# Patient Record
Sex: FEMALE | Race: WHITE
[De-identification: names, ages, dates, MRNs, and addresses within clinical notes are randomized per-mention and may not be internally consistent; named-entity substitution may affect disease eponyms.]

---

## 2017-04-14 ENCOUNTER — HOSPITAL ENCOUNTER (EMERGENCY)
Dept: HOSPITAL 31 - C.ER | Age: 27
Discharge: HOME | End: 2017-04-14
Payer: COMMERCIAL

## 2017-04-14 VITALS — BODY MASS INDEX: 21.2 KG/M2

## 2017-04-14 VITALS
TEMPERATURE: 98 F | DIASTOLIC BLOOD PRESSURE: 71 MMHG | SYSTOLIC BLOOD PRESSURE: 106 MMHG | HEART RATE: 70 BPM | OXYGEN SATURATION: 98 %

## 2017-04-14 VITALS — RESPIRATION RATE: 20 BRPM

## 2017-04-14 DIAGNOSIS — B37.3: Primary | ICD-10-CM

## 2017-04-14 LAB
BACTERIA #/AREA URNS HPF: (no result) /[HPF]
BILIRUB UR-MCNC: NEGATIVE MG/DL
GLUCOSE UR STRIP-MCNC: NORMAL MG/DL
KETONES UR STRIP-MCNC: NEGATIVE MG/DL
LEUKOCYTE ESTERASE UR-ACNC: (no result) LEU/UL
PH UR STRIP: 5 [PH] (ref 5–8)
PROT UR STRIP-MCNC: (no result) MG/DL
RBC # UR STRIP: (no result) /UL
RBC #/AREA URNS HPF: 91 /HPF (ref 0–3)
SP GR UR STRIP: 1.03 (ref 1–1.03)
UROBILINOGEN UR-MCNC: NORMAL MG/DL (ref 0.2–1)
WBC #/AREA URNS HPF: 5 /HPF (ref 0–5)

## 2017-04-14 NOTE — C.PDOC
History Of Present Illness


A 26 year old female presents to the emergency room with complaints of vaginal 

pruritis and  vaginal discharge for 1 week. Patient was seen by GYN 2 weeks ago 

and was diagnosed with bacterial vaginosis. Patient was given Metrogel which 

finished 3 days ago. Patient reports continued discharge. Patient states that 

she went to the GYN and was told to wait a little bit longer for symptoms to 

resolve. Patient felt uncomfortable and decided to come to the ER. Patient 

denies any dysuria, frequency, hematuria, abdominal pain, back pain, 

incontinence, fever, chills, or any other complaints. 


Time Seen by Provider: 04/14/17 20:48


Chief Complaint (Nursing): Female Genitourinary


History Per: Patient


History/Exam Limitations: no limitations


Onset/Duration Of Symptoms: Other (1 week)


Current Symptoms Are (Timing): Still Present


Severity: Mild


Quality Of Discomfort: Other (Uncomfortable)


Associated Symptoms: denies: Fever, Chills, Urinary Symptoms


Alleviating Factors: None


Recent travel outside of the [place]: No





Past Medical History


Reviewed: Historical Data, Nursing Documentation, Vital Signs


Vital Signs: 


 Last Vital Signs











Temp  98 F   04/14/17 20:35


 


Pulse  70   04/14/17 20:35


 


Resp  20   04/14/17 22:20


 


BP  106/71   04/14/17 20:35


 


Pulse Ox  98   04/14/17 22:33














- [facility] Procedures








INJECT/INFUSE NEC (08/29/13)








Family History: States: Unknown Family Hx





- Social History


Hx Tobacco Use: No


Hx Alcohol Use: No


Hx Substance Use: No





- Immunization History


Hx Tetanus Toxoid Vaccination: No


Hx Influenza Vaccination: No


Hx Pneumococcal Vaccination: No





Review Of Systems


Except As Marked, All Systems Reviewed And Found Negative.


Constitutional: Negative for: Fever, Chills


Genitourinary: Positive for: Vaginal Discharge.  Negative for: Dysuria, 

Frequency, Incontinence, Hematuria





Physical Exam





- Physical Exam


Appears: Well


Skin: Normal Color, Warm, Dry


Head: Atraumatic, Normacephalic


Eye(s): bilateral: Normal Inspection


Cardiovascular: Rhythm Regular


Respiratory: Normal Breath Sounds, No Rales, No Rhonchi, No Wheezing


Gastrointestinal/Abdominal: Soft, No Tenderness, No Guarding, No Rebound


Pelvic: No Vaginal Bleeding, Vaginal Discharge (White clumps of cottage cheese 

like discharge), No Cervical Motion Tenderness, No Adnexal Tenderness, No Mass


Extremity: Normal ROM, No Tenderness, Swelling (Volar swelling)


Neurological/Psych: Oriented x3, Normal Speech





ED Course And Treatment





- Laboratory Results


Urine Pregnancy POC: Negative


O2 Sat by Pulse Oximetry: 98


Progress Note: Urine Preg was negative. Patient was swabbed, but didn't treat 

for GC/Chlamydia because clinical presentation looks like vaginitis. Patient 

received Diflucan and prescription for tablets to take in 1 week, if symptoms 

do not resolve. Patient denies any abdominal pain, vaginal bleeding, dysuria, 

frequency, back pain, or any other complaints. Patient is afebrile and stable 

for discharge. Patient instructed to follow up with GYN within 1-2 days.





Disposition





- Disposition


Disposition: HOME/ ROUTINE


Disposition Time: 22:00


Condition: STABLE


Additional Instructions: 


Follow up with your OBGYN within 1-2 days. Return to Ed if feel worse.


Prescriptions: 


Fluconazole [Diflucan] 150 mg PO ONCE #1 tab


Instructions:  Vulvovaginal Candidiasis (ED)





- Clinical Impression


Clinical Impression: 


 Candidal vulvovaginitis





- Scribe Statement


The provider has reviewed the documentation as recorded by the Scriblouann Sosa





All medical record entries made by the Sujataiblouann were at my direction and 

personally dictated by me. I have reviewed the chart and agree that the record 

accurately reflects my personal performance of the history, physical exam, 

medical decision making, and the department course for this patient. I have 

also personally directed, reviewed, and agree with the discharge instructions 

and disposition.

## 2017-07-28 ENCOUNTER — HOSPITAL ENCOUNTER (EMERGENCY)
Dept: HOSPITAL 31 - C.ER | Age: 27
Discharge: HOME | End: 2017-07-28
Payer: COMMERCIAL

## 2017-07-28 VITALS — BODY MASS INDEX: 21.2 KG/M2

## 2017-07-28 VITALS
HEART RATE: 85 BPM | TEMPERATURE: 98 F | RESPIRATION RATE: 16 BRPM | DIASTOLIC BLOOD PRESSURE: 74 MMHG | SYSTOLIC BLOOD PRESSURE: 111 MMHG | OXYGEN SATURATION: 100 %

## 2017-07-28 DIAGNOSIS — N76.0: Primary | ICD-10-CM

## 2017-07-28 LAB
BILIRUB UR-MCNC: NEGATIVE MG/DL
GLUCOSE UR STRIP-MCNC: NORMAL MG/DL
KETONES UR STRIP-MCNC: NEGATIVE MG/DL
LEUKOCYTE ESTERASE UR-ACNC: (no result) LEU/UL
PH UR STRIP: 6 [PH] (ref 5–8)
PROT UR STRIP-MCNC: NEGATIVE MG/DL
RBC # UR STRIP: (no result) /UL
RBC #/AREA URNS HPF: 24 /HPF (ref 0–3)
SP GR UR STRIP: 1.02 (ref 1–1.03)
UROBILINOGEN UR-MCNC: NORMAL MG/DL (ref 0.2–1)
WBC #/AREA URNS HPF: < 1 /HPF (ref 0–5)

## 2017-07-28 NOTE — C.PDOC
History Of Present Illness





25 yo female come in for evaluation of vaginal irritation, white discharges 

developed for past few days " after sexual intercourse and condom broke". 

Otherwise, pt denies fever, chills, sore throat, abd. pain, N/V, back pain, 

hematuria, denies lesion. Ambulate to ED for evaluation, not in any apparent 

distress.


Time Seen by Provider: 07/28/17 19:44


Chief Complaint (Nursing): Female Genitourinary


History Per: Patient


Onset/Duration Of Symptoms: Gradual





Past Medical History


Reviewed: Historical Data, Nursing Documentation, Vital Signs


Vital Signs: 





 Last Vital Signs











Temp  98 F   07/28/17 19:35


 


Pulse  85   07/28/17 19:35


 


Resp  16   07/28/17 19:35


 


BP  111/74   07/28/17 19:35


 


Pulse Ox  100   07/28/17 19:35














- Medical History


PMH: No Chronic Diseases


Surgical History: No Surg Hx





- [facility] Procedures











INJECT/INFUSE NEC (08/29/13)








Family History: States: No Known Family Hx





- Social History


Hx Tobacco Use: No


Hx Alcohol Use: No


Hx Substance Use: No





- Immunization History


Hx Tetanus Toxoid Vaccination: No


Hx Influenza Vaccination: No


Hx Pneumococcal Vaccination: No





Review Of Systems


Except As Marked, All Systems Reviewed And Found Negative.


Constitutional: Negative for: Fever, Chills


ENT: Negative for: Ear Discharge, Nose Discharge, Throat Pain


Cardiovascular: Negative for: Chest Pain


Respiratory: Negative for: Cough, Shortness of Breath, Wheezing


Gastrointestinal: Negative for: Nausea, Vomiting, Abdominal Pain


Genitourinary: Positive for: Vaginal Discharge.  Negative for: Dysuria, 

Frequency, Incontinence, Hematuria, Vaginal Bleeding, Rash


Musculoskeletal: Negative for: Back Pain


Skin: Negative for: Rash


Neurological: Negative for: Weakness, Numbness, Altered Mental Status, Headache

, Dizziness





Physical Exam





- Physical Exam


Appears: Well, Non-toxic, No Acute Distress


Skin: Normal Color, Warm, No Rash


Nose: No Discharge


Oral Mucosa: Moist, No Drooling


Throat: No Erythema, No Exudate, No Drooling


Neck: Supple


Cardiovascular: Rhythm Regular


Respiratory: No Decreased Breath Sounds, No Accessory Muscle Use, No Stridor, 

No Wheezing


Gastrointestinal/Abdominal: Soft, No Tenderness, No Distention, No Guarding


Back: No CVA Tenderness


Pelvic: Other (refused)


Extremity: Normal ROM, No Tenderness, No Pedal Edema, No Deformity


Neurological/Psych: Oriented x3, Normal Speech





ED Course And Treatment





- Laboratory Results


Urine Pregnancy POC: Negative


O2 Sat by Pulse Oximetry: 100


Pulse Ox Interpretation: Normal


Progress Note: On re-evaluation, pt is afebrile, hemodynamicaly stable.  Non-

toxic.  Ambulatory in ED.  PuslEOx 100% RA.  ENT: no acute findings.  Lungs: CT 

B/L, BS equal B/L.  ABd: benign, (-) guarding, (-) rebound.  Back: (-) CVA 

tenderness.  UA review and appears normal.  Pt was offered STD tx-refused, pt 

sts " has scheduled appoitment next week with GYN and will do all there".  Pt 

has clinical findings c/w vulvovaginitis.  Pt advised and ref. to F/U with GYN 

as scheduled.  return to ED if any worsening or new changes.





Disposition


Counseled Patient/Family Regarding: Studies Performed, Diagnosis, Need For 

Followup, Rx Given





- Disposition


[facility]errals[facility] 


[facility]


Disposition: HOME/ ROUTINE


Disposition Time: 20:51


Condition: STABLE


Additional Instructions: 


Encourage fluids





Take medication as prescribed





Follow up with GYN in 2-3 days for re-evaluation.


Return to ED if any worsening or new changes.


Prescriptions: 


Miconazole/Cleanser 17 On Wipe [Monistat 7 Combination Pack] 1 each VG HS #1 kit


Instructions:  Vulvovaginal Candidiasis (ED)


Forms:  [facility] (English)





- Clinical Impression


Clinical Impression: 


 Vaginitis and vulvovaginitis

## 2017-09-16 ENCOUNTER — HOSPITAL ENCOUNTER (EMERGENCY)
Dept: HOSPITAL 31 - C.ER | Age: 27
LOS: 1 days | Discharge: HOME | End: 2017-09-17
Payer: COMMERCIAL

## 2017-09-16 VITALS — BODY MASS INDEX: 21.2 KG/M2
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
## 2017-09-16 DIAGNOSIS — S80.861A: ICD-10-CM

## 2017-09-16 DIAGNOSIS — S40.861A: Primary | ICD-10-CM

## 2017-09-16 DIAGNOSIS — S80.862A: ICD-10-CM
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
## 2017-09-17 VITALS
TEMPERATURE: 98 F | DIASTOLIC BLOOD PRESSURE: 79 MMHG | HEART RATE: 77 BPM | SYSTOLIC BLOOD PRESSURE: 123 MMHG | RESPIRATION RATE: 17 BRPM

## 2017-09-17 VITALS — OXYGEN SATURATION: 98 %

## 2017-09-17 NOTE — C.PDOC
History Of Present Illness


26 year old female who presents to the ER with a complaint of an itchy insect 

bite to the right arm and right leg. Denies difficulty breathing, fever, or 

recent travel.





Time Seen by Provider: 09/16/17 23:58


Chief Complaint (Nursing): Abnormal Skin Integrity


History Per: Patient


History/Exam Limitations: no limitations


Onset/Duration Of Symptoms: Hrs


Current Symptoms Are (Timing): Still Present


Location Of Injury: Right: Arm, Leg


Quality Of Symptoms: Itching


Recent travel outside of the [place]: No





Past Medical History


Reviewed: Historical Data, Nursing Documentation, Vital Signs


Vital Signs: 


 Last Vital Signs











Temp  98 F   09/17/17 00:50


 


Pulse  77   09/17/17 00:50


 


Resp  17   09/17/17 00:50


 


BP  123/79   09/17/17 00:50


 


Pulse Ox  98   09/17/17 02:57














- Medical History


PMH: No Chronic Diseases


Surgical History: No Surg Hx





- [facility] Procedures








INJECT/INFUSE NEC (08/29/13)








Family History: States: Unknown Family Hx





- Social History


Hx Tobacco Use: No


Hx Alcohol Use: No


Hx Substance Use: No





- Immunization History


Hx Tetanus Toxoid Vaccination: No


Hx Influenza Vaccination: No


Hx Pneumococcal Vaccination: No





Review Of Systems


Constitutional: Negative for: Fever, Chills


ENT: Negative for: Throat Swelling


Respiratory: Negative for: Shortness of Breath, Wheezing


Skin: Positive for: Rash





Physical Exam





- Physical Exam


Appears: Non-toxic, No Acute Distress


Skin: Warm, Dry, Rash (Scattered erythematous papules to right upper extremity 

and bilateral lower extremities. No swelling or warmth.)


Head: Atraumatic, Normacephalic


Oral Mucosa: Moist


Chest: Symmetrical, No Tenderness


Cardiovascular: Rhythm Regular, No Murmur


Respiratory: Normal Breath Sounds, No Rales, No Rhonchi, No Stridor, No Wheezing


Extremity: Normal ROM (x4)


Pulses: Left Radial: Normal, Right Radial: Normal, Left Dorsalis Pedis: Normal, 

Right Dorsalis Pedis: Normal


Neurological/Psych: Oriented x3, Normal Speech, Normal Cognition


Gait: Steady





ED Course And Treatment


O2 Sat by Pulse Oximetry: 98 (Room air)


Pulse Ox Interpretation: Normal


Progress Note: Benadryl administered. On reevaluation, patient itchiness has 

resolved, will discharge home with wound care instructions and instruct to 

follow up with PMD.





Disposition


Counseled Patient/Family Regarding: Diagnosis, Need For Followup, Rx Given





- Disposition


Referrals: 


Evan Eaton MD [Staff Provider] - 


Disposition: HOME/ ROUTINE


Disposition Time: 00:43


Condition: STABLE


Additional Instructions: 


May use hydrocortisone cream





Claritin or zyrtec if needed for itching





Return to ER if increasing pain, fever, swelling or draining


Instructions:  Insect Bite or Sting (ED)


Forms:  [facility] (English)





- Clinical Impression


Clinical Impression: 


 Insect bite








- Scribe Statement


The provider has reviewed the documentation as recorded by the Scriblouann Mcknight





All medical record entries made by the Sujataiblouann were at my direction and 

personally dictated by me. I have reviewed the chart and agree that the record 

accurately reflects my personal performance of the history, physical exam, 

medical decision making, and the department course for this patient. I have 

also personally directed, reviewed, and agree with the discharge instructions 

and disposition.

## 2017-09-30 ENCOUNTER — HOSPITAL ENCOUNTER (EMERGENCY)
Dept: HOSPITAL 31 - C.ER | Age: 27
Discharge: HOME | End: 2017-09-30
Payer: COMMERCIAL

## 2017-09-30 VITALS — RESPIRATION RATE: 16 BRPM | HEART RATE: 81 BPM | SYSTOLIC BLOOD PRESSURE: 138 MMHG | DIASTOLIC BLOOD PRESSURE: 84 MMHG

## 2017-09-30 VITALS — TEMPERATURE: 98.3 F

## 2017-09-30 VITALS — BODY MASS INDEX: 21.2 KG/M2

## 2017-09-30 DIAGNOSIS — N76.0: Primary | ICD-10-CM

## 2017-09-30 LAB
BACTERIA #/AREA URNS HPF: (no result) /[HPF]
BILIRUB UR-MCNC: NEGATIVE MG/DL
COLOR UR: YELLOW
GLUCOSE UR STRIP-MCNC: NEGATIVE MG/DL
KETONES UR STRIP-MCNC: NEGATIVE MG/DL
LEUKOCYTE ESTERASE UR-ACNC: NEGATIVE LEU/UL
PH UR STRIP: 6 [PH] (ref 5–8)
PROT UR STRIP-MCNC: NEGATIVE MG/DL
RBC # UR STRIP: (no result) /UL
RBC #/AREA URNS HPF: 18 /HPF (ref 0–3)
SP GR UR STRIP: 1.03 (ref 1–1.03)
UROBILINOGEN UR-MCNC: 0.2 MG/DL (ref 0.2–1)
WBC #/AREA URNS HPF: 1 /HPF (ref 0–5)

## 2017-09-30 NOTE — C.PDOC
History Of Present Illness


26 year old female presents to the ED for evaluation of vaginal irritation and 

itchiness associated with scant white discharged which developed over the past 

week. Patient admits she was using over-the-counter medication for yeast 

infection without significant improvements. Patient denies fever, chills, 

abdominal pain, dysuria, hematuria, increased urinary frequency. 


Time Seen by Provider: 09/30/17 14:13


Chief Complaint (Nursing): Female Genitourinary


History Per: Patient


History/Exam Limitations: no limitations


Onset/Duration Of Symptoms: Days (1 week)


Current Symptoms Are (Timing): Still Present


Quality Of Discomfort: denies: "Pain"


Associated Symptoms: denies: Fever, Chills, Urinary Symptoms, Other


Additional History Per: Patient


Abnormal Vaginal Bleeding: No





Past Medical History


Reviewed: Historical Data, Nursing Documentation, Vital Signs


Vital Signs: 


 Last Vital Signs











Temp  98.3 F   09/30/17 13:59


 


Pulse  81   09/30/17 16:00


 


Resp  16   09/30/17 16:00


 


BP  138/84   09/30/17 16:00


 


Pulse Ox  98   09/30/17 16:00














- Medical History


PMH: No Chronic Diseases


Surgical History: No Surg Hx





- [facility] Procedures








INJECT/INFUSE NEC (08/29/13)








Family History: States: Unknown Family Hx





- Social History


Hx Tobacco Use: No


Hx Alcohol Use: No


Hx Substance Use: No





- Immunization History


Hx Tetanus Toxoid Vaccination: No


Hx Influenza Vaccination: No


Hx Pneumococcal Vaccination: No





Review Of Systems


Constitutional: Negative for: Fever, Chills


Gastrointestinal: Negative for: Abdominal Pain


Genitourinary: Positive for: Vaginal Discharge (scant, white ), Other (vaginal 

irritation/itching ).  Negative for: Dysuria, Frequency, Hematuria





Physical Exam





- Physical Exam


Appears: Non-toxic, No Acute Distress


Skin: Normal Color, Warm, Dry


Oral Mucosa: Moist


Throat: No Erythema


Neck: Supple


Chest: Symmetrical, No Deformity


Cardiovascular: Rhythm Regular, No Murmur


Respiratory: No Decreased Breath Sounds, No Accessory Muscle Use, No Rales, No 

Rhonchi, No Wheezing


Gastrointestinal/Abdominal: Soft, No Tenderness, No Guarding, No Rebound


Pelvic: No Vaginal Bleeding, Vaginal Discharge (scant white thick discharges), 

No Cervical Motion Tenderness, No Adnexal Tenderness


Extremity: Normal ROM, No Pedal Edema


Neurological/Psych: Oriented x3, Normal Speech, Normal Cognition


Gait: Steady





ED Course And Treatment


O2 Sat by Pulse Oximetry: 100 (on RA)


Pulse Ox Interpretation: Normal


Progress Note: Urinalysis ordered and reviewed.  On re-eval, pt is afebrile, 

hemodynamicaly stable.  Non-toxic.  ENT: No acute finidngs.  Abd: benign.  Back

: (-) CVA tenderness.  UA results review and appears normal.  Pt has clinical 

findings c/w vulvovaginitis.  Pt advised.  ref to f/u with GYN in 2-3 days for 

re-eval.  return if any new changes.





Disposition


Counseled Patient/Family Regarding: Studies Performed, Diagnosis, Need For 

Followup, Rx Given





- Disposition


[facility]errals[facility] 


[facility]


Disposition: HOME/ ROUTINE


Disposition Time: 15:23


Condition: STABLE


Additional Instructions: 


Take medication as prescribed





Follow up with GYN in 2-3 days for re-evaluation.


Return to ED if any worsening or new changes.


Prescriptions: 


Miconazole/Cleanser 17 On Wipe [Monistat 7 Combination Pack] 1 each VG HS #1 kit


Instructions:  Vulvovaginal Candidiasis (ED)


Forms:  [facility] (English)





- Clinical Impression


Clinical Impression: 


 Vaginitis and vulvovaginitis








- PA / NP / Resident Statement


MD/DO has reviewed & agrees with the documentation as recorded.





- Scribe Statement


The provider has reviewed the documentation as recorded by the Scribe (Deyanira Zimmer)








All medical record entries made by the Scribe were at my direction and 

personally dictated by me. I have reviewed the chart and agree that the record 

accurately reflects my personal performance of the history, physical exam, 

medical decision making, and the department course for this patient. I have 

also personally directed, reviewed, and agree with the discharge instructions 

and disposition.

## 2017-10-01 VITALS — OXYGEN SATURATION: 100 %

## 2018-01-10 ENCOUNTER — HOSPITAL ENCOUNTER (EMERGENCY)
Dept: HOSPITAL 31 - C.ER | Age: 28
LOS: 1 days | Discharge: HOME | End: 2018-01-11
Payer: COMMERCIAL

## 2018-01-10 VITALS — BODY MASS INDEX: 21.2 KG/M2

## 2018-01-10 DIAGNOSIS — N76.0: ICD-10-CM

## 2018-01-10 DIAGNOSIS — Z87.891: ICD-10-CM

## 2018-01-10 DIAGNOSIS — N39.0: ICD-10-CM

## 2018-01-10 DIAGNOSIS — J03.90: Primary | ICD-10-CM

## 2018-01-10 PROCEDURE — 87070 CULTURE OTHR SPECIMN AEROBIC: CPT

## 2018-01-10 PROCEDURE — 81001 URINALYSIS AUTO W/SCOPE: CPT

## 2018-01-10 PROCEDURE — 87430 STREP A AG IA: CPT

## 2018-01-10 PROCEDURE — 84703 CHORIONIC GONADOTROPIN ASSAY: CPT

## 2018-01-10 PROCEDURE — 87804 INFLUENZA ASSAY W/OPTIC: CPT

## 2018-01-10 PROCEDURE — 96372 THER/PROPH/DIAG INJ SC/IM: CPT

## 2018-01-10 PROCEDURE — 99282 EMERGENCY DEPT VISIT SF MDM: CPT

## 2018-01-11 VITALS
SYSTOLIC BLOOD PRESSURE: 112 MMHG | DIASTOLIC BLOOD PRESSURE: 73 MMHG | HEART RATE: 97 BPM | RESPIRATION RATE: 18 BRPM | TEMPERATURE: 99 F

## 2018-01-11 VITALS — OXYGEN SATURATION: 98 %

## 2018-01-11 LAB
BACTERIA #/AREA URNS HPF: (no result) /[HPF]
BILIRUB UR-MCNC: NEGATIVE MG/DL
GLUCOSE UR STRIP-MCNC: (no result) MG/DL
HCG,QUALITATIVE URINE: NEGATIVE
INFLUENZA A B: (no result)
LEUKOCYTE ESTERASE UR-ACNC: (no result) LEU/UL
PH UR STRIP: 6 [PH] (ref 5–8)
PROT UR STRIP-MCNC: (no result) MG/DL
RBC # UR STRIP: (no result) /UL
SP GR UR STRIP: 1.03 (ref 1–1.03)
SQUAMOUS EPITHIAL: 7 /HPF (ref 0–5)
URINE NITRATE: NEGATIVE
UROBILINOGEN UR-MCNC: NORMAL MG/DL (ref 0.2–1)
WBC CLUMPS # UR AUTO: (no result) /HPF

## 2018-01-11 NOTE — C.PDOC
History Of Present Illness


27 years old female presents to ED for an evaluation of fever, bodyaches, sore 

throat that began 2 days ago. Patient also reports " dehydrated, my urine has 

been dark color" . Otherwise, pt denies severe headache, neck pain, drooling,  

dyspnea, cough, wheezing, abdominal pain, V/D, urinary symptoms, rash, denies 

known sick contact. Ambulate to ED for evaluation, not in any apparent distress.


Time Seen by Provider: 01/11/18 00:11


Chief Complaint (Nursing): ENT Problem


History Per: Patient


History/Exam Limitations: no limitations


Onset/Duration Of Symptoms: Days (2)


Current Symptoms Are (Timing): Still Present


Recent travel outside of the [place]: No





Past Medical History


Reviewed: Historical Data, Nursing Documentation, Vital Signs


Vital Signs: 


 Last Vital Signs











Temp  102.8 F H  01/11/18 00:12


 


Pulse  107 H  01/11/18 00:12


 


Resp  20   01/11/18 00:12


 


BP  120/86   01/11/18 00:12


 


Pulse Ox  98   01/11/18 01:24














- Medical History


PMH: No Chronic Diseases


Surgical History: No Surg Hx





- [facility] Procedures








INJECT/INFUSE NEC (08/29/13)








Family History: States: Unknown Family Hx





- Social History


Hx Tobacco Use: No


Hx Alcohol Use: No


Hx Substance Use: No





- Immunization History


Hx Tetanus Toxoid Vaccination: No


Hx Influenza Vaccination: No


Hx Pneumococcal Vaccination: No





Review Of Systems


Constitutional: Positive for: Fever


ENT: Positive for: Other (Sore throat)


Respiratory: Negative for: Cough


Gastrointestinal: Negative for: Nausea, Vomiting, Abdominal Pain


Genitourinary: Positive for: Other (Discoloration of urine).  Negative for: 

Dysuria, Frequency, Hematuria


Musculoskeletal: Positive for: Other (Bodyaches)


Neurological: Negative for: Weakness, Numbness, Headache, Dizziness





Physical Exam





- Physical Exam


Appears: Well, Non-toxic


Skin: Warm, Dry, No Rash


Head: Normacephalic


Eye(s): bilateral: PERRL


Ear(s): Bilateral: Normal


Nose: No Flaring, Discharge (B/L nasal congestion )


Oral Mucosa: Moist, No Drooling


Tongue: Normal Appearing


Lips: Normal Appearing


Throat: Erythema (B/L pharyngeal with mild edema), Exudate (Bilateral tonsils 

with edema. Uvula midline, no edema.), No Drooling


Neck: Trachea Midline, Supple, Other ((-) meningeal sign)


Chest: No Tenderness


Cardiovascular: Rhythm Regular


Respiratory: No Decreased Breath Sounds, No Accessory Muscle Use, No Stridor, 

No Wheezing


Gastrointestinal/Abdominal: Soft, No Tenderness, No Organomegaly, No Distention

, No Guarding, No Rebound


Back: No CVA Tenderness


Neurological/Psych: Oriented x3, Normal Speech, Normal Cognition, Normal Motor, 

Normal Sensation, Normal Reflexes





ED Course And Treatment


O2 Sat by Pulse Oximetry: 98 (RA)


Pulse Ox Interpretation: Normal


Progress Note: Administered Ibuprofen. Ordered flu AB swab, rapid strep, and 

urinalysis.  On re-evaluation, pt reports " feels little better".  Fever 

improved, hemodynamicaly stable.  Non-toxic. Tolerate PO well in ED.  Neck: 

Supple, (-) meningeal sign.  ENT: no acute findings.  Lungs: CTA B/L, BS equal B

/L.  Abd: benign, (-) guaridng, (-) rebound.  neuorlogical intact.  Influenza 

AND RST (-). UA results review (+) UTI.  Pt has clinical findings c/w acute 

pharyngitis/tonsilitis, UTI. PT  advised.   REf. to F/u with PMD in 2-3 days 

for re-eavl.  return if any new changes.





Disposition


Counseled Patient/Family Regarding: Studies Performed, Diagnosis, Need For 

Followup, Rx Given





- Disposition


Referrals: 


Evan Eaton MD [Staff Provider] - 


Disposition: HOME/ ROUTINE


Disposition Time: 01:49


Condition: STABLE


Additional Instructions: 


BEDREST FOR 2-3 DAYS





TAKE MEDICATION AS PRESCRIBED





ENCOURAGE FLUIDS





FOLLOW UP WITH PMD, GYN IN 2-3 DAYS FOR RE-EVALUATION.


RETURN TO ED IF ANY WORSENING OR NEW CHANGES.


Prescriptions: 


Ciprofloxacin [Cipro] 1 tab PO BID #14 tab


Ibuprofen [Motrin Tab] 600 mg PO Q6 #14 tab


Miconazole [Miconazole 7] 1 supp VG HS #7 sup


Instructions:  Tonsillitis (ED), Urinary Tract Infection in Women (ED), 

Vulvovaginal Candidiasis (ED)


Forms:  [facility] (English)





- Clinical Impression


Clinical Impression: 


 Urinary tract infection, Vaginitis and vulvovaginitis, Tonsillitis








- PA / NP / Resident Statement


MD/DO has reviewed & agrees with the documentation as recorded.





- Scribe Statement


The provider has reviewed the documentation as recorded by the Scribe





Greg Barahona





All medical record entries made by the Scribe were at my direction and 

personally dictated by me. I have reviewed the chart and agree that the record 

accurately reflects my personal performance of the history, physical exam, 

medical decision making, and the department course for this patient. I have 

also personally directed, reviewed, and agree with the discharge instructions 

and disposition.

## 2018-09-14 ENCOUNTER — HOSPITAL ENCOUNTER (EMERGENCY)
Dept: HOSPITAL 31 - C.ER | Age: 28
Discharge: HOME | End: 2018-09-14
Payer: COMMERCIAL

## 2018-09-14 VITALS
OXYGEN SATURATION: 100 % | TEMPERATURE: 98.1 F | SYSTOLIC BLOOD PRESSURE: 117 MMHG | DIASTOLIC BLOOD PRESSURE: 78 MMHG | RESPIRATION RATE: 18 BRPM | HEART RATE: 77 BPM

## 2018-09-14 VITALS — BODY MASS INDEX: 21.2 KG/M2

## 2018-09-14 DIAGNOSIS — N39.0: ICD-10-CM

## 2018-09-14 DIAGNOSIS — A60.00: ICD-10-CM

## 2018-09-14 DIAGNOSIS — B37.3: Primary | ICD-10-CM

## 2018-09-14 LAB
BACTERIA #/AREA URNS HPF: (no result) /[HPF]
BILIRUB UR-MCNC: NEGATIVE MG/DL
GLUCOSE UR STRIP-MCNC: NORMAL MG/DL
HCG,QUALITATIVE URINE: NEGATIVE
LEUKOCYTE ESTERASE UR-ACNC: (no result) LEU/UL
PH UR STRIP: 6 [PH] (ref 5–8)
PROT UR STRIP-MCNC: (no result) MG/DL
RBC # UR STRIP: (no result) /UL
SP GR UR STRIP: 1.03 (ref 1–1.03)
SQUAMOUS EPITHIAL: 18 /HPF (ref 0–5)
UROBILINOGEN UR-MCNC: NORMAL MG/DL (ref 0.2–1)

## 2018-09-14 NOTE — C.PDOC
History Of Present Illness


26 y/o female presents to the ED complaining of vaginal itching for the past 2 

days. Associated with white discharge. Denies any fever, vaginal bleeding, and 

abdominal pain.





Time Seen by Provider: 09/14/18 11:59


Chief Complaint (Nursing): Female Genitourinary


History Per: Patient


History/Exam Limitations: no limitations


Onset/Duration Of Symptoms: Days


Current Symptoms Are (Timing): Still Present





Past Medical History


Reviewed: Historical Data, Nursing Documentation, Vital Signs


Vital Signs: 


 Last Vital Signs











Temp  98.1 F   09/14/18 11:28


 


Pulse  77   09/14/18 11:28


 


Resp  18   09/14/18 14:25


 


BP  117/78   09/14/18 11:28


 


Pulse Ox  100   09/14/18 14:10














- Medical History


PMH: No Chronic Diseases


Surgical History: No Surg Hx





- [facility] Procedures








INJECT/INFUSE NEC (08/29/13)








Family History: States: Unknown Family Hx





- Social History


Hx Tobacco Use: No


Hx Alcohol Use: No


Hx Substance Use: No





- Immunization History


Hx Tetanus Toxoid Vaccination: No


Hx Influenza Vaccination: No


Hx Pneumococcal Vaccination: No





Review Of Systems


Constitutional: Negative for: Fever, Chills


Gastrointestinal: Negative for: Vomiting, Abdominal Pain


Genitourinary: Positive for: Vaginal Discharge (white), Other (Vaginal itching/

irritation).  Negative for: Dysuria, Frequency, Incontinence, Vaginal Bleeding





Physical Exam





- Physical Exam


Appears: Non-toxic, No Acute Distress


Skin: Warm, Dry


Head: Atraumatic, Normacephalic


Eye(s): bilateral: Normal Inspection


Neck: Normal ROM


Chest: Symmetrical


Cardiovascular: Rhythm Regular, No Murmur


Respiratory: Normal Breath Sounds, No Accessory Muscle Use, Other (no 

respiratory distress)


Gastrointestinal/Abdominal: Soft, No Tenderness, No Guarding, No Rebound


Pelvic: No Vaginal Bleeding, Vaginal Discharge (white discharge), No Cervical 

Motion Tenderness, No Adnexal Tenderness, Other (Patient has +vesicles to left 

labia, Exam chaperoned by THANIA Chavira)


Extremity: Bilateral: Atraumatic, Normal Color And Temperature, Normal ROM


Neurological/Psych: Oriented x3, Normal Speech





ED Course And Treatment


O2 Sat by Pulse Oximetry: 100 (RA)


Pulse Ox Interpretation: Normal





Medical Decision Making


Medical Decision Making: 





On further discussion, patient is aware of vesicular rash and has known hx of 

genital herpes. States she currently has an outbreak. 





Plan:


* Urinalysis


* Urine culture


* Urine HCG





Patient treated with diflucan. On reevaluation she remains afebrile, AAOx3, in 

no acute distress, and is stable for discharge home. 





Disposition





- Disposition


Referrals: 


Evan Eaton MD [Staff Provider] - 


Disposition: HOME/ ROUTINE


Disposition Time: 14:06


Condition: STABLE


Additional Instructions: 


Follow up with the medical doctor within 1-2 days. Return if worsened. 


Prescriptions: 


Acyclovir 400 mg PO TID #15 tablet


Nitrofurantoin Macrocrystals [Macrobid] 1 cap PO BID #14 cap


Instructions:  Genital Herpes, Vaginal Yeast Infection (DC)


Forms:  [facility] (English)





- Clinical Impression


Clinical Impression: 


 Candidal vulvovaginitis, Urinary tract infection, Herpes genitalia








- PA / NP / Resident Statement


MD/DO has reviewed & agrees with the documentation as recorded.





- Scribe Statement


The provider has reviewed the documentation as recorded by the Scribe (Citlali Smith)





All medical record entries made by the Scribe were at my direction and 

personally dictated by me. I have reviewed the chart and agree that the record 

accurately reflects my personal performance of the history, physical exam, 

medical decision making, and the department course for this patient. I have 

also personally directed, reviewed, and agree with the discharge instructions 

and disposition.

## 2018-11-08 ENCOUNTER — HOSPITAL ENCOUNTER (EMERGENCY)
Dept: HOSPITAL 31 - C.ER | Age: 28
Discharge: HOME | End: 2018-11-08
Payer: COMMERCIAL

## 2018-11-08 VITALS — SYSTOLIC BLOOD PRESSURE: 128 MMHG | HEART RATE: 75 BPM | DIASTOLIC BLOOD PRESSURE: 72 MMHG | RESPIRATION RATE: 18 BRPM

## 2018-11-08 VITALS — OXYGEN SATURATION: 100 %

## 2018-11-08 VITALS — TEMPERATURE: 98.7 F

## 2018-11-08 VITALS — BODY MASS INDEX: 21.2 KG/M2

## 2018-11-08 DIAGNOSIS — N72: Primary | ICD-10-CM

## 2018-11-08 PROCEDURE — 87491 CHLMYD TRACH DNA AMP PROBE: CPT

## 2018-11-08 PROCEDURE — 87591 N.GONORRHOEAE DNA AMP PROB: CPT

## 2018-11-08 PROCEDURE — 99284 EMERGENCY DEPT VISIT MOD MDM: CPT

## 2018-11-08 PROCEDURE — 96372 THER/PROPH/DIAG INJ SC/IM: CPT

## 2018-11-08 NOTE — C.PDOC
History Of Present Illness


27 year old female presents to the ED c/o vaginal discharge that she noticed 

tonight PTA. Patient states she usually get some vaginal discharge but states 

this time is different. Patient reports she is sexually active with long term 

partner and uses no protection. Patient denies fever, chills, nausea, vomit, 

diarrhea, rash, vaginal bleeding, dyspareunia, back pain, dysuria, hematuria. 


Time Seen by Provider: 11/08/18 00:28


Chief Complaint (Nursing): Female Genitourinary


History Per: Patient


History/Exam Limitations: no limitations


Onset/Duration Of Symptoms: Hrs


Current Symptoms Are (Timing): Still Present


Quality: Positive for: Other


Recent travel outside of the [place]: No


Additional History Per: Patient





Past Medical History


Reviewed: Historical Data, Nursing Documentation, Vital Signs


Vital Signs: 





                                Last Vital Signs











Temp  98.7 F   11/08/18 00:26


 


Pulse  79   11/08/18 00:26


 


Resp      


 


BP  120/77   11/08/18 00:26


 


Pulse Ox  100   11/08/18 00:26














- Medical History


PMH: No Chronic Diseases


Surgical History: No Surg Hx





- [facility] Procedures











INJECT/INFUSE NEC (08/29/13)








Family History: States: Unknown Family Hx





- Social History


Hx Tobacco Use: No


Hx Alcohol Use: No


Hx Substance Use: No





- Immunization History


Hx Tetanus Toxoid Vaccination: No


Hx Influenza Vaccination: No


Hx Pneumococcal Vaccination: No





Review Of Systems


Constitutional: Negative for: Fever, Chills


Cardiovascular: Negative for: Chest Pain


Respiratory: Negative for: Shortness of Breath


Gastrointestinal: Negative for: Nausea, Vomiting, Abdominal Pain


Genitourinary: Positive for: Vaginal Discharge.  Negative for: Dysuria, 

Hematuria, Vaginal Bleeding


Musculoskeletal: Negative for: Back Pain


Skin: Negative for: Rash





Physical Exam





- Physical Exam


Appears: Non-toxic, No Acute Distress


Skin: Normal Color, Warm, Dry


Head: Atraumatic, Normacephalic


Eye(s): bilateral: Normal Inspection


Neck: Normal ROM, Supple


Chest: Symmetrical


Cardiovascular: Rhythm Regular


Respiratory: Normal Breath Sounds, No Rales, No Rhonchi, No Wheezing


Gastrointestinal/Abdominal: Soft, No Tenderness, No Guarding, No Rebound


Pelvic: Vaginal Discharge (thick yellow foul smelling), Cervical Motion 

Tenderness, No Adnexal Tenderness, No Mass


Extremity: Normal ROM


Neurological/Psych: Oriented x3, Normal Speech, Normal Cognition


Gait: Steady





ED Course And Treatment





- Laboratory Results


Urine Pregnancy POC: Negative


O2 Sat by Pulse Oximetry: 100 (ON RA)


Pulse Ox Interpretation: Normal


Progress Note: Plan:  - Chlamydia/GC.  - Rocephin 250 mg IM.  - Zithromax 1,000 

mg PO.  Patient was initiated on prophylaxis and was educated on safe sex. Nazanin

ent was advised to follow up with STD clinic or GYN for further STD tests.





Disposition


Counseled Patient/Family Regarding: Diagnosis, Need For Followup, Rx Given





- Disposition


Referrals: 


Non [facility] Provider, [Primary Care Provider] - 


Disposition: HOME/ ROUTINE


Disposition Time: 00:59


Condition: STABLE


Additional Instructions: 


Please follow up with Gyn in 2-3 days for reeval





Take flagyl as directed





Have partner evaluated or tested for STD 





Return to ER if worse 


Prescriptions: 


metroNIDAZOLE [Flagyl] 500 mg PO BID #14 tab


Instructions:  Bacterial Vaginosis (DC), Screening for Sexually Transmitted 

Infections


Forms:  [facility] (English)





- Clinical Impression


Clinical Impression: 


 Cervicitis








- PA / NP / Resident Statement


MD/DO has reviewed & agrees with the documentation as recorded.





- Scribe Statement


The provider has reviewed the documentation as recorded by the Scribe


Sarath Whiting





All medical record entries made by the Sujataiblouann were at my direction and 

personally dictated by me. I have reviewed the chart and agree that the record 

accurately reflects my personal performance of the history, physical exam, 

medical decision making, and the department course for this patient. I have also

 personally directed, reviewed, and agree with the discharge instructions and 

disposition.

## 2018-12-08 ENCOUNTER — HOSPITAL ENCOUNTER (EMERGENCY)
Dept: HOSPITAL 31 - C.ER | Age: 28
Discharge: HOME | End: 2018-12-08
Payer: COMMERCIAL

## 2018-12-08 VITALS — BODY MASS INDEX: 21.2 KG/M2

## 2018-12-08 VITALS — RESPIRATION RATE: 18 BRPM

## 2018-12-08 VITALS — HEART RATE: 84 BPM | TEMPERATURE: 98.5 F | DIASTOLIC BLOOD PRESSURE: 85 MMHG | SYSTOLIC BLOOD PRESSURE: 124 MMHG

## 2018-12-08 VITALS — OXYGEN SATURATION: 100 %

## 2018-12-08 DIAGNOSIS — N76.0: Primary | ICD-10-CM

## 2018-12-08 DIAGNOSIS — B96.89: ICD-10-CM

## 2018-12-08 LAB
BILIRUB UR-MCNC: NEGATIVE MG/DL
GLUCOSE UR STRIP-MCNC: NORMAL MG/DL
HCG,QUALITATIVE URINE: NEGATIVE
LEUKOCYTE ESTERASE UR-ACNC: (no result) LEU/UL
PH UR STRIP: 5 [PH] (ref 5–8)
PROT UR STRIP-MCNC: (no result) MG/DL
RBC # UR STRIP: (no result) /UL
SP GR UR STRIP: 1.03 (ref 1–1.03)
SQUAMOUS EPITHIAL: 4 /HPF (ref 0–5)
UROBILINOGEN UR-MCNC: NORMAL MG/DL (ref 0.2–1)

## 2018-12-08 NOTE — C.PDOC
History Of Present Illness


28 year old female presents to the emergency department with three days of 

vaginal discharge, described as white/yellow and odorous. Patient denies fever, 

abdominal pain, vomiting, diarrhea, and dysuria. 


Time Seen by Provider: 12/08/18 18:46


Chief Complaint (Nursing): Female Genitourinary


History Per: Patient


History/Exam Limitations: no limitations


Onset/Duration Of Symptoms: Days (3)


Current Symptoms Are (Timing): Still Present


Quality Of Discomfort: denies: "Pain"


Associated Symptoms: Other (vaginal discharge)





Past Medical History


Reviewed: Historical Data, Nursing Documentation, Vital Signs


Vital Signs: 





                                Last Vital Signs











Temp  98.1 F   12/08/18 18:41


 


Pulse  90   12/08/18 18:41


 


Resp  18   12/08/18 18:41


 


BP  97/61 L  12/08/18 18:41


 


Pulse Ox  100   12/08/18 18:41














- Medical History


PMH: No Chronic Diseases


Surgical History: No Surg Hx





- [facility] Procedures











INJECT/INFUSE NEC (08/29/13)








Family History: States: No Known Family Hx





- Social History


Hx Tobacco Use: No


Hx Alcohol Use: No


Hx Substance Use: No





- Immunization History


Hx Tetanus Toxoid Vaccination: No


Hx Influenza Vaccination: No


Hx Pneumococcal Vaccination: No





Review Of Systems


Except As Marked, All Systems Reviewed And Found Negative.


Constitutional: Negative for: Fever


Gastrointestinal: Negative for: Vomiting, Abdominal Pain, Diarrhea


Genitourinary: Positive for: Vaginal Discharge.  Negative for: Dysuria





Physical Exam





- Physical Exam


Appears: Well, Non-toxic, No Acute Distress


Skin: Normal Color, Warm, Dry


Head: Atraumatic, Normacephalic


Eye(s): bilateral: Normal Inspection, PERRL, EOMI


Oral Mucosa: Moist


Neck: Normal, Supple


Chest: Symmetrical, No Tenderness


Cardiovascular: Rhythm Regular, No Murmur


Respiratory: No Rales, No Rhonchi, No Wheezing


Gastrointestinal/Abdominal: Normal Exam, Soft, No Tenderness, No Guarding, No 

Rebound


Pelvic: No Vaginal Bleeding, Vaginal Discharge ((+) white and yellow discharge),

No Cervical Motion Tenderness, Other (Chaperone: Taylor Vega RN)


Extremity: Normal ROM, No Tenderness, No Swelling


Neurological/Psych: Oriented x3, Normal Speech, Normal Cognition, Normal Motor, 

Normal Sensation


Gait: Steady





ED Course And Treatment


O2 Sat by Pulse Oximetry: 100 (RA)


Pulse Ox Interpretation: Normal


Progress Note: Plan:  Chlamydia GC/RNA.  Flagyl 500mg PO.  Rocephin 250mg PO.  

Zithromax 1000mg PO.  HCG Qualitative Urine.  Urinalysis





Disposition





- Disposition


Referrals: 


Mendoza Perales MD [Staff Provider] - 


Disposition: HOME/ ROUTINE


Disposition Time: 20:23


Condition: STABLE


Additional Instructions: 


Follow up with the medical doctor within 1-2 days. Return if worsened. 


Prescriptions: 


metroNIDAZOLE [Flagyl] 500 mg PO BID #14 tab


Instructions:  Bacterial Vaginosis (DC)


Forms:  [facility] (English)





- Clinical Impression


Clinical Impression: 


 Bacterial vaginosis








- PA / NP / Resident Statement


MD/DO has reviewed & agrees with the documentation as recorded.





- Scribe Statement


The provider has reviewed the documentation as recorded by the Scribe (Neal Bejarano)


All medical record entries made by the Scribe were at my direction and 

personally dictated by me. I have reviewed the chart and agree that the record 

accurately reflects my personal performance of the history, physical exam, 

medical decision making, and the department course for this patient. I have also

 personally directed, reviewed, and agree with the discharge instructions and 

disposition.

## 2019-01-03 ENCOUNTER — HOSPITAL ENCOUNTER (EMERGENCY)
Dept: HOSPITAL 31 - C.ER | Age: 29
Discharge: HOME | End: 2019-01-03
Payer: COMMERCIAL

## 2019-01-03 VITALS
RESPIRATION RATE: 14 BRPM | SYSTOLIC BLOOD PRESSURE: 128 MMHG | OXYGEN SATURATION: 97 % | TEMPERATURE: 98.6 F | HEART RATE: 68 BPM | DIASTOLIC BLOOD PRESSURE: 77 MMHG

## 2019-01-03 VITALS — BODY MASS INDEX: 23 KG/M2

## 2019-01-03 DIAGNOSIS — N73.0: Primary | ICD-10-CM

## 2019-01-03 LAB
BACTERIA #/AREA URNS HPF: (no result) {NULL, NULL}
BILIRUB UR-MCNC: NEGATIVE {NULL, NULL}
GLUCOSE UR STRIP-MCNC: NORMAL {NULL, MG/DL}
LEUKOCYTE ESTERASE UR-ACNC: (no result) {NULL, LEU/UL}
PH UR STRIP: 5 {NULL, NULL} (ref 5–8)
PROT UR STRIP-MCNC: (no result) {NULL, MG/DL}
RBC # UR STRIP: (no result) {NULL, NULL}
SP GR UR STRIP: 1.03 {NULL, NULL} (ref 1–1.03)
SQUAMOUS EPITHIAL: 1 {NULL, /HPF} (ref 0–5)
UROBILINOGEN UR-MCNC: NORMAL {NULL, MG/DL} (ref 0.2–1)

## 2019-01-03 PROCEDURE — 87070 CULTURE OTHR SPECIMN AEROBIC: CPT

## 2019-01-03 PROCEDURE — 87181 SC STD AGAR DILUTION PER AGT: CPT

## 2019-01-03 PROCEDURE — 87086 URINE CULTURE/COLONY COUNT: CPT

## 2019-01-03 PROCEDURE — 87591 N.GONORRHOEAE DNA AMP PROB: CPT

## 2019-01-03 PROCEDURE — 87491 CHLMYD TRACH DNA AMP PROBE: CPT

## 2019-01-03 PROCEDURE — 99281 EMR DPT VST MAYX REQ PHY/QHP: CPT

## 2019-01-03 PROCEDURE — 81001 URINALYSIS AUTO W/SCOPE: CPT

## 2019-01-03 PROCEDURE — 96372 THER/PROPH/DIAG INJ SC/IM: CPT

## 2019-01-03 NOTE — C.PDOC
History Of Present Illness


28 year old female presents to the ER with a complaint of whitish vaginal 

discharge and pelvic pain for the past 3 days. Denies fever or dysuria.


Chief Complaint (Nursing): Female Genitourinary


History Per: Patient


History/Exam Limitations: no limitations


Onset/Duration Of Symptoms: Days (3)


Current Symptoms Are (Timing): Still Present


Quality Of Discomfort: Unable To Describe


Associated Symptoms: Other (Whitish vaginal discharge)


Recent travel outside of the [place]: No


Abnormal Vaginal Bleeding: No





Past Medical History


Reviewed: Historical Data, Nursing Documentation, Vital Signs





- [facility] Procedures











INJECT/INFUSE NEC (08/29/13)








Family History: States: Unknown Family Hx





- Social History


Hx Tobacco Use: No


Hx Alcohol Use: No


Hx Substance Use: No





- Immunization History


Hx Tetanus Toxoid Vaccination: No


Hx Influenza Vaccination: No


Hx Pneumococcal Vaccination: No





Review Of Systems


Constitutional: Negative for: Fever, Chills


Cardiovascular: Negative for: Chest Pain, Palpitations


Respiratory: Negative for: Cough, Shortness of Breath


Gastrointestinal: Negative for: Nausea, Vomiting


Genitourinary: Positive for: Vaginal Discharge, Pelvic Pain


Neurological: Negative for: Weakness, Numbness





Physical Exam





- Physical Exam


Appears: Non-toxic


Skin: Normal Color, Warm, Dry


Head: Atraumatic, Normacephalic


Eye(s): bilateral: Normal Inspection


Oral Mucosa: Moist


Chest: Symmetrical, No Tenderness


Cardiovascular: Rhythm Regular


Respiratory: Normal Breath Sounds, No Rales, No Rhonchi, No Wheezing


Gastrointestinal/Abdominal: Soft, Tenderness (Mild hypogastric), No Guarding, No

Rebound


Back: No CVA Tenderness


Pelvic: No Vaginal Bleeding, Vaginal Discharge (Yellowish white), No Cervical 

Motion Tenderness


Neurological/Psych: Oriented x3, Normal Speech





ED Course And Treatment


Progress Note: Urinalysis ordered and GC/Chlamydia sent. Rocephin and zithromax 

administered.





Disposition


Counseled Patient/Family Regarding: Diagnosis





- Disposition


Referrals: 


[facility] [Outside]


Disposition: HOME/ ROUTINE


Disposition Time: 03:20


Condition: STABLE


Prescriptions: 


RX: Doxycycline Hyclate 100 mg PO BID #14 capsule


Instructions:  Pelvic Inflammatory Disease (DC)


Forms:  [facility] (English)





- POA


Present On Arrival: None





- Clinical Impression


Clinical Impression: 


 PID (acute pelvic inflammatory disease)








- Scribe Statement


The provider has reviewed the documentation as recorded by the Scribe





Tyler Mcknight





All medical record entries made by the Scribe were at my direction and 

personally dictated by me. I have reviewed the chart and agree that the record 

accurately reflects my personal performance of the history, physical exam, 

medical decision making, and the department course for this patient. I have also

 personally directed, reviewed, and agree with the discharge instructions and 

disposition.

## 2019-01-11 ENCOUNTER — HOSPITAL ENCOUNTER (EMERGENCY)
Dept: HOSPITAL 31 - C.ER | Age: 29
LOS: 1 days | Discharge: HOME | End: 2019-01-12
Payer: COMMERCIAL

## 2019-01-11 VITALS
DIASTOLIC BLOOD PRESSURE: 74 MMHG | OXYGEN SATURATION: 100 % | HEART RATE: 81 BPM | RESPIRATION RATE: 20 BRPM | TEMPERATURE: 99.1 F | SYSTOLIC BLOOD PRESSURE: 117 MMHG

## 2019-01-11 VITALS — BODY MASS INDEX: 23 KG/M2

## 2019-01-11 DIAGNOSIS — N72: Primary | ICD-10-CM

## 2019-01-11 LAB
BILIRUB UR-MCNC: NEGATIVE MG/DL
GLUCOSE UR STRIP-MCNC: NORMAL MG/DL
LEUKOCYTE ESTERASE UR-ACNC: (no result) LEU/UL
PH UR STRIP: 6 [PH] (ref 5–8)
PROT UR STRIP-MCNC: (no result) MG/DL
RBC # UR STRIP: (no result) /UL
SP GR UR STRIP: 1.03 (ref 1–1.03)
SQUAMOUS EPITHIAL: 1 /HPF (ref 0–5)
UROBILINOGEN UR-MCNC: NORMAL MG/DL (ref 0.2–1)

## 2019-01-11 PROCEDURE — 96372 THER/PROPH/DIAG INJ SC/IM: CPT

## 2019-01-11 PROCEDURE — 99284 EMERGENCY DEPT VISIT MOD MDM: CPT

## 2019-01-11 PROCEDURE — 86780 TREPONEMA PALLIDUM: CPT

## 2019-01-11 PROCEDURE — 87070 CULTURE OTHR SPECIMN AEROBIC: CPT

## 2019-01-11 PROCEDURE — 86592 SYPHILIS TEST NON-TREP QUAL: CPT

## 2019-01-11 PROCEDURE — 81025 URINE PREGNANCY TEST: CPT

## 2019-01-11 PROCEDURE — 87591 N.GONORRHOEAE DNA AMP PROB: CPT

## 2019-01-11 PROCEDURE — 81001 URINALYSIS AUTO W/SCOPE: CPT

## 2019-01-11 PROCEDURE — 87491 CHLMYD TRACH DNA AMP PROBE: CPT

## 2019-01-12 NOTE — C.PDOC
History Of Present Illness


28 year old female was seen last week for vaginal discomfort and pelvic pain, 

treated with doxy and rocephin, and cultures were sent.She reports that 5 days 

after visit she had sexual intercourse with a different partner then with her 

previous partner again; 2 days after having sex with the newer partner she began

having discomfort again. Patient is unsure if pain is due to antibiotics or 

yeast infection. Denies any other complaints.


Chief Complaint (Nursing): Female Genitourinary


History Per: Patient


History/Exam Limitations: no limitations


Onset/Duration Of Symptoms: Days


Current Symptoms Are (Timing): Still Present


Quality Of Discomfort: Unable To Describe


Alleviating Factors: None


Recent travel outside of the [place]: No


Abnormal Vaginal Bleeding: No





Past Medical History


Reviewed: Historical Data, Nursing Documentation, Vital Signs


Vital Signs: 





                                Last Vital Signs











Temp  99.1 F   01/11/19 23:03


 


Pulse  81   01/11/19 23:03


 


Resp  20   01/11/19 23:03


 


BP  117/74   01/11/19 23:03


 


Pulse Ox  100   01/11/19 23:03














- [facility] Procedures











INJECT/INFUSE NEC (08/29/13)








Family History: States: Unknown Family Hx





- Social History


Hx Tobacco Use: No


Hx Alcohol Use: No


Hx Substance Use: No





- Immunization History


Hx Tetanus Toxoid Vaccination: Yes


Hx Influenza Vaccination: No


Hx Pneumococcal Vaccination: No





Review Of Systems


Constitutional: Negative for: Fever, Chills


Eyes: Negative for: Pain, Redness


ENT: Negative for: Mouth Swelling


Cardiovascular: Negative for: Chest Pain


Respiratory: Negative for: Cough, Shortness of Breath


Gastrointestinal: Negative for: Nausea, Vomiting, Diarrhea


Genitourinary: Positive for: Pelvic Pain.  Negative for: Dysuria, Hematuria


Musculoskeletal: Negative for: Back Pain


Skin: Negative for: Rash


Neurological: Negative for: Weakness, Numbness, Dizziness





Physical Exam





- Physical Exam


Appears: Well, Non-toxic, No Acute Distress


Skin: Normal Color, Warm


Head: Atraumatic, Normacephalic


Eye(s): bilateral: Normal Inspection, PERRL, EOMI


Ear(s): Bilateral: Normal


Nose: Normal


Oral Mucosa: Moist


Neck: Normal ROM, Supple


Chest: Symmetrical, No Tenderness


Cardiovascular: Rhythm Regular


Respiratory: No Accessory Muscle Use, Other (Normal inspiratory)


Gastrointestinal/Abdominal: Soft, No Tenderness, No Distention


Pelvic: No Vaginal Bleeding, Vaginal Discharge (Mild in the vault), Cervical Mot

ion Tenderness, Adnexal Tenderness (Bilateral), Tender Uterus, Other (No 

lesions, Chaperoned by KEDAR Cervantes)


Neurological/Psych: Oriented x3, Normal Speech, Normal Cranial Nerves (Grossly 

intact)


Gait: Steady





ED Course And Treatment





- Laboratory Results


Lab Results: 





                                        











Urine Color  Yellow  (YELLOW)   01/11/19  23:38    


 


Urine Clarity  Clear  (Clear)   01/11/19  23:38    


 


Urine pH  6.0  (5.0-8.0)   01/11/19  23:38    


 


Ur Specific Gravity  1.026  (1.003-1.030)   01/11/19  23:38    


 


Urine Protein  1+ mg/dL (NEGATIVE)  H  01/11/19  23:38    


 


Urine Glucose (UA)  Normal mg/dL (Normal)   01/11/19  23:38    


 


Urine Ketones  Negative mg/dL (NEGATIVE)   01/11/19  23:38    


 


Urine Blood  2+  (NEGATIVE)  H  01/11/19  23:38    


 


Urine Nitrate  Negative  (NEGATIVE)   01/11/19  23:38    


 


Urine Bilirubin  Negative  (NEGATIVE)   01/11/19  23:38    


 


Urine Urobilinogen  Normal mg/dL (0.2-1.0)   01/11/19  23:38    


 


Ur Leukocyte Esterase  Neg Rafael/uL (Negative)   01/11/19  23:38    


 


Urine WBC (Auto)  1 /hpf (0-5)   01/11/19  23:38    


 


Urine RBC (Auto)  17 /hpf (0-3)  H  01/11/19  23:38    


 


Ur Squamous Epith Cells  1 /hpf (0-5)   01/11/19  23:38    











O2 Sat by Pulse Oximetry: 100 (Room air)


Pulse Ox Interpretation: Normal





Medical Decision Making


Medical Decision Making: 


Patient retested and retreated, started on flagyl for possible trichomonas or 

bacterial vaginitis, she will await culture results and follow up at her GYN 

clinic.





Disposition





- Disposition


Disposition: HOME/ ROUTINE


Disposition Time: 00:47


Condition: STABLE


Prescriptions: 


metroNIDAZOLE [Flagyl] 500 mg PO BID 14 Days  tab


Instructions:  Screening for Sexually Transmitted Infections


Forms:  [facility] (English), General Discharge Instructions





- Clinical Impression


Clinical Impression: 


 Cervicitis








- PA / NP / Resident Statement


MD/DO has reviewed & agrees with the documentation as recorded.





- Scribe Statement


The provider has reviewed the documentation as recorded by the Scribe





Tyler Mcknight





All medical record entries made by the Sujataiblouann were at my direction and 

personally dictated by me. I have reviewed the chart and agree that the record 

accurately reflects my personal performance of the history, physical exam, 

medical decision making, and the department course for this patient. I have also

 personally directed, reviewed, and agree with the discharge instructions and 

disposition.

## 2019-02-27 ENCOUNTER — HOSPITAL ENCOUNTER (EMERGENCY)
Dept: HOSPITAL 31 - C.ER | Age: 29
Discharge: HOME | End: 2019-02-27
Payer: COMMERCIAL

## 2019-02-27 VITALS
HEART RATE: 89 BPM | RESPIRATION RATE: 16 BRPM | DIASTOLIC BLOOD PRESSURE: 80 MMHG | TEMPERATURE: 99.7 F | OXYGEN SATURATION: 100 % | SYSTOLIC BLOOD PRESSURE: 124 MMHG

## 2019-02-27 VITALS — BODY MASS INDEX: 23 KG/M2

## 2019-02-27 DIAGNOSIS — F17.210: ICD-10-CM

## 2019-02-27 DIAGNOSIS — J03.90: Primary | ICD-10-CM

## 2019-02-27 NOTE — C.PDOC
History Of Present Illness


29 y/o female presents to the ED complaining of a sore throat for the past few 

days. Associated with painful swallowing. Patient also reports having bodyaches 

and chills. No documented temperature. + Sick contact in patients son, who was 

seen here and diagnosed with URI. 





Time Seen by Provider: 02/27/19 19:39


Chief Complaint (Nursing): ENT Problem


History Per: Patient


History/Exam Limitations: None


Onset/Duration Of Symptoms: Days


Current Symptoms Are (Timing): Still Present





Past Medical History


Reviewed: Historical Data, Nursing Documentation, Vital Signs


Vital Signs: 





                                Last Vital Signs











Temp  99.7 F H  02/27/19 19:45


 


Pulse  89   02/27/19 19:45


 


Resp  16   02/27/19 19:45


 


BP  124/80   02/27/19 19:45


 


Pulse Ox  100   02/27/19 19:45











Surgical History: No Surg Hx





- [facility] Procedures











INJECT/INFUSE NEC (08/29/13)








Family History: States: Unknown Family Hx





- Social History


Hx Tobacco Use: Yes


Hx Alcohol Use: No


Hx Substance Use: No





- Immunization History


Hx Tetanus Toxoid Vaccination: Yes


Hx Influenza Vaccination: No


Hx Pneumococcal Vaccination: No





Review Of Systems


Constitutional: Positive for: Chills, Other (Bodyaches)


Eyes: Negative for: Vision Change, Redness


ENT: Positive for: Throat Pain, Other (Painful swallowing)


Cardiovascular: Negative for: Chest Pain


Respiratory: Negative for: Shortness of Breath, Wheezing


Gastrointestinal: Negative for: Nausea, Vomiting, Diarrhea


Musculoskeletal: Negative for: Back Pain


Skin: Negative for: Rash


Neurological: Negative for: Weakness, Numbness, Dizziness





Physical Exam





- Physical Exam


Appears: Well, Non-toxic, No Acute Distress


Skin: Normal Color, Warm, No Rash


Head: Normacephalic


Eye(s): bilateral: Normal Inspection (no scleral icterus), PERRL, EOMI


Ear(s): Bilateral: Normal (no drainage)


Oral Mucosa: Moist


Throat: Exudate (Enlarged tonsils bilaterally, with exudates), Other (Uvula 

midline, patient able to swallow)


Neck: Normal ROM, Supple


Lymphatic: No Adenopathy


Chest: Symmetrical


Respiratory: No Accessory Muscle Use, Other (Normal inspiratory effort)


Extremity: Bilateral: Atraumatic, Normal ROM


Pulses: Left Radial: Normal, Right Radial: Normal


Neurological/Psych: Oriented x3, Normal Cranial Nerves


Gait: Steady





ED Course And Treatment


O2 Sat by Pulse Oximetry: 100 (RA)


Pulse Ox Interpretation: Normal





Medical Decision Making


Medical Decision Making: 





Impression: Tonsillitis





Patient is resting comfortably, able to swallow and tolerate PO fluids. 


Will discharge patient home with rx for amoxicillin and motrin. Given doses of 

each in the ED. 


Patient counseled regarding diagnosis and follow up instructions. 





Disposition


Counseled Patient/Family Regarding: Diagnosis, Need For Followup, Rx Given





- Disposition


Disposition: HOME/ ROUTINE


Disposition Time: 20:14


Condition: STABLE


Prescriptions: 


Amoxicillin 500 mg PO TID #21 tablet


Ibuprofen [Motrin Tab] 600 mg PO TID #21 tab


Instructions:  Sore Throat, Adult (DC)


Forms:  Work/School/Gym Excuse, [facility] (English)





- Clinical Impression


Clinical Impression: 


 Acute tonsillitis








- PA / NP / Resident Statement


MD/DO has reviewed & agrees with the documentation as recorded.





- Scribe Statement


The provider has reviewed the documentation as recorded by the Sujataiblouann Smith





All medical record entries made by the Sujataiblouann were at my direction and 

personally dictated by me. I have reviewed the chart and agree that the record 

accurately reflects my personal performance of the history, physical exam, 

medical decision making, and the department course for this patient. I have also

personally directed, reviewed, and agree with the discharge instructions and 

disposition.

## 2019-03-08 ENCOUNTER — HOSPITAL ENCOUNTER (EMERGENCY)
Dept: HOSPITAL 31 - C.ER | Age: 29
Discharge: HOME | End: 2019-03-08
Payer: COMMERCIAL

## 2019-03-08 VITALS — BODY MASS INDEX: 23 KG/M2

## 2019-03-08 VITALS — HEART RATE: 100 BPM | TEMPERATURE: 99.2 F | SYSTOLIC BLOOD PRESSURE: 117 MMHG | DIASTOLIC BLOOD PRESSURE: 82 MMHG

## 2019-03-08 VITALS — RESPIRATION RATE: 16 BRPM
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
## 2019-03-08 DIAGNOSIS — S10.93XA: Primary | ICD-10-CM

## 2019-03-08 NOTE — CT
Date of service: 



03/08/2019



PROCEDURE:  CT HEAD WITHOUT CONTRAST.



HISTORY:

Fainted.  Trauma.  Strangulation.



COMPARISON:

None available.



TECHNIQUE:

Axial computed tomography images were obtained through the head/brain 

without intravenous contrast.  



Radiation dose:



Total exam DLP = 1197.94 mGy-cm.



This CT exam was performed using one or more of the following dose 

reduction techniques: Automated exposure control, adjustment of the 

mA and/or kV according to patient size, and/or use of iterative 

reconstruction technique.



FINDINGS:



HEMORRHAGE:

No intracranial hemorrhage. 



BRAIN:

No mass effect or edema.  No atrophy or chronic microvascular 

ischemic changes.



VENTRICLES:

Unremarkable. No hydrocephalus. 



CALVARIUM:

Unremarkable.



PARANASAL SINUSES:

Unremarkable as visualized. No significant inflammatory changes.



MASTOID AIR CELLS:

Unremarkable as visualized. No inflammatory changes.



OTHER FINDINGS:

None.



IMPRESSION:

No acute intracranial abnormality. 



If symptoms persists, consider correlation with MRI. 



A preliminary report was generated at 6:11 a.m. on 03/08/2018 by Dr. Adryan Foss from [facility].
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
## 2019-03-09 VITALS — OXYGEN SATURATION: 97 %
